# Patient Record
Sex: MALE | HISPANIC OR LATINO | URBAN - METROPOLITAN AREA
[De-identification: names, ages, dates, MRNs, and addresses within clinical notes are randomized per-mention and may not be internally consistent; named-entity substitution may affect disease eponyms.]

---

## 2023-09-13 ENCOUNTER — LAB REQUISITION (OUTPATIENT)
Dept: LAB | Facility: CLINIC | Age: 53
End: 2023-09-13

## 2023-09-13 DIAGNOSIS — I10 ESSENTIAL (PRIMARY) HYPERTENSION: ICD-10-CM

## 2023-09-13 PROCEDURE — 82374 ASSAY BLOOD CARBON DIOXIDE: CPT | Performed by: INTERNAL MEDICINE

## 2023-09-13 PROCEDURE — 80061 LIPID PANEL: CPT | Performed by: INTERNAL MEDICINE

## 2023-09-14 LAB
ANION GAP SERPL CALCULATED.3IONS-SCNC: 14 MMOL/L (ref 7–15)
BUN SERPL-MCNC: 13.7 MG/DL (ref 6–20)
CALCIUM SERPL-MCNC: 9.1 MG/DL (ref 8.6–10)
CHLORIDE SERPL-SCNC: 100 MMOL/L (ref 98–107)
CHOLEST SERPL-MCNC: 183 MG/DL
CREAT SERPL-MCNC: 1.11 MG/DL (ref 0.67–1.17)
DEPRECATED HCO3 PLAS-SCNC: 23 MMOL/L (ref 22–29)
EGFRCR SERPLBLD CKD-EPI 2021: 80 ML/MIN/1.73M2
GLUCOSE SERPL-MCNC: 85 MG/DL (ref 70–99)
HDLC SERPL-MCNC: 54 MG/DL
LDLC SERPL CALC-MCNC: 94 MG/DL
NONHDLC SERPL-MCNC: 129 MG/DL
POTASSIUM SERPL-SCNC: 4.5 MMOL/L (ref 3.4–5.3)
SODIUM SERPL-SCNC: 137 MMOL/L (ref 136–145)
TRIGL SERPL-MCNC: 177 MG/DL

## 2024-05-29 ENCOUNTER — LAB REQUISITION (OUTPATIENT)
Dept: LAB | Facility: CLINIC | Age: 54
End: 2024-05-29

## 2024-05-29 DIAGNOSIS — Z11.3 ENCOUNTER FOR SCREENING FOR INFECTIONS WITH A PREDOMINANTLY SEXUAL MODE OF TRANSMISSION: ICD-10-CM

## 2024-05-29 DIAGNOSIS — Z11.1 ENCOUNTER FOR SCREENING FOR RESPIRATORY TUBERCULOSIS: ICD-10-CM

## 2024-05-29 DIAGNOSIS — E78.5 HYPERLIPIDEMIA, UNSPECIFIED: ICD-10-CM

## 2024-05-29 LAB
CHOLEST SERPL-MCNC: 221 MG/DL
FASTING STATUS PATIENT QL REPORTED: NO
HBV CORE AB SERPL QL IA: NONREACTIVE
HBV SURFACE AB SERPL IA-ACNC: <3.5 M[IU]/ML
HBV SURFACE AB SERPL IA-ACNC: NONREACTIVE M[IU]/ML
HBV SURFACE AG SERPL QL IA: NONREACTIVE
HCV AB SERPL QL IA: NONREACTIVE
HDLC SERPL-MCNC: 49 MG/DL
LDLC SERPL CALC-MCNC: 111 MG/DL
NONHDLC SERPL-MCNC: 172 MG/DL
TRIGL SERPL-MCNC: 303 MG/DL

## 2024-05-29 PROCEDURE — 86704 HEP B CORE ANTIBODY TOTAL: CPT | Performed by: FAMILY MEDICINE

## 2024-05-29 PROCEDURE — 86803 HEPATITIS C AB TEST: CPT | Performed by: FAMILY MEDICINE

## 2024-05-29 PROCEDURE — 87340 HEPATITIS B SURFACE AG IA: CPT | Performed by: FAMILY MEDICINE

## 2024-05-29 PROCEDURE — 86481 TB AG RESPONSE T-CELL SUSP: CPT | Performed by: FAMILY MEDICINE

## 2024-05-29 PROCEDURE — 80061 LIPID PANEL: CPT | Performed by: FAMILY MEDICINE

## 2024-05-29 PROCEDURE — 86706 HEP B SURFACE ANTIBODY: CPT | Performed by: FAMILY MEDICINE

## 2024-05-30 LAB
GAMMA INTERFERON BACKGROUND BLD IA-ACNC: 0.03 IU/ML
M TB IFN-G BLD-IMP: NEGATIVE
M TB IFN-G CD4+ BCKGRND COR BLD-ACNC: 9.97 IU/ML
MITOGEN IGNF BCKGRD COR BLD-ACNC: 0.05 IU/ML
MITOGEN IGNF BCKGRD COR BLD-ACNC: 0.05 IU/ML
QUANTIFERON MITOGEN: 10 IU/ML
QUANTIFERON NIL TUBE: 0.03 IU/ML
QUANTIFERON TB1 TUBE: 0.08 IU/ML
QUANTIFERON TB2 TUBE: 0.08

## 2024-07-15 ENCOUNTER — LAB REQUISITION (OUTPATIENT)
Dept: LAB | Facility: CLINIC | Age: 54
End: 2024-07-15

## 2024-07-15 DIAGNOSIS — Z12.11 ENCOUNTER FOR SCREENING FOR MALIGNANT NEOPLASM OF COLON: ICD-10-CM

## 2024-07-15 PROCEDURE — 82274 ASSAY TEST FOR BLOOD FECAL: CPT | Performed by: FAMILY MEDICINE

## 2024-07-16 LAB — HEMOCCULT STL QL IA: NEGATIVE

## 2024-07-31 ENCOUNTER — LAB REQUISITION (OUTPATIENT)
Dept: LAB | Facility: CLINIC | Age: 54
End: 2024-07-31

## 2024-07-31 DIAGNOSIS — R30.0 DYSURIA: ICD-10-CM

## 2024-07-31 PROCEDURE — 87491 CHLMYD TRACH DNA AMP PROBE: CPT | Performed by: FAMILY MEDICINE

## 2024-07-31 PROCEDURE — 87591 N.GONORRHOEAE DNA AMP PROB: CPT | Performed by: FAMILY MEDICINE

## 2024-08-01 LAB
C TRACH DNA SPEC QL NAA+PROBE: NEGATIVE
N GONORRHOEA DNA SPEC QL NAA+PROBE: NEGATIVE

## 2024-08-19 ENCOUNTER — HOSPITAL ENCOUNTER (OUTPATIENT)
Dept: GENERAL RADIOLOGY | Facility: CLINIC | Age: 54
Discharge: HOME | End: 2024-08-19
Attending: FAMILY MEDICINE | Admitting: FAMILY MEDICINE
Payer: COMMERCIAL

## 2024-08-19 DIAGNOSIS — R05.3 CHRONIC COUGH: ICD-10-CM

## 2024-08-19 PROCEDURE — 71046 X-RAY EXAM CHEST 2 VIEWS: CPT

## 2024-08-20 ENCOUNTER — LAB REQUISITION (OUTPATIENT)
Dept: LAB | Facility: CLINIC | Age: 54
End: 2024-08-20

## 2024-08-20 DIAGNOSIS — R35.1 NOCTURIA: ICD-10-CM

## 2024-08-20 LAB — PSA SERPL DL<=0.01 NG/ML-MCNC: 0.39 NG/ML (ref 0–3.5)

## 2024-08-20 PROCEDURE — G0103 PSA SCREENING: HCPCS | Performed by: PEDIATRICS

## 2024-10-08 ENCOUNTER — LAB REQUISITION (OUTPATIENT)
Dept: LAB | Facility: CLINIC | Age: 54
End: 2024-10-08
Payer: COMMERCIAL

## 2024-10-08 DIAGNOSIS — R05.3 CHRONIC COUGH: ICD-10-CM

## 2024-10-08 LAB
BASOPHILS # BLD AUTO: 0 10E3/UL (ref 0–0.2)
BASOPHILS NFR BLD AUTO: 0 %
EOSINOPHIL # BLD AUTO: 0.1 10E3/UL (ref 0–0.7)
EOSINOPHIL NFR BLD AUTO: 2 %
ERYTHROCYTE [DISTWIDTH] IN BLOOD BY AUTOMATED COUNT: 12.6 % (ref 10–15)
HCT VFR BLD AUTO: 44.5 % (ref 40–53)
HGB BLD-MCNC: 14.4 G/DL (ref 13.3–17.7)
IMM GRANULOCYTES # BLD: 0 10E3/UL
IMM GRANULOCYTES NFR BLD: 0 %
LYMPHOCYTES # BLD AUTO: 1.6 10E3/UL (ref 0.8–5.3)
LYMPHOCYTES NFR BLD AUTO: 17 %
MCH RBC QN AUTO: 29.5 PG (ref 26.5–33)
MCHC RBC AUTO-ENTMCNC: 32.4 G/DL (ref 31.5–36.5)
MCV RBC AUTO: 91 FL (ref 78–100)
MONOCYTES # BLD AUTO: 1.1 10E3/UL (ref 0–1.3)
MONOCYTES NFR BLD AUTO: 12 %
NEUTROPHILS # BLD AUTO: 6.7 10E3/UL (ref 1.6–8.3)
NEUTROPHILS NFR BLD AUTO: 69 %
NRBC # BLD AUTO: 0 10E3/UL
NRBC BLD AUTO-RTO: 0 /100
PLATELET # BLD AUTO: 353 10E3/UL (ref 150–450)
RBC # BLD AUTO: 4.88 10E6/UL (ref 4.4–5.9)
WBC # BLD AUTO: 9.6 10E3/UL (ref 4–11)

## 2024-10-08 PROCEDURE — 85025 COMPLETE CBC W/AUTO DIFF WBC: CPT | Mod: ORL | Performed by: PEDIATRICS

## 2024-11-26 ENCOUNTER — MEDICAL CORRESPONDENCE (OUTPATIENT)
Dept: HEALTH INFORMATION MANAGEMENT | Facility: CLINIC | Age: 54
End: 2024-11-26

## 2024-11-26 ENCOUNTER — LAB REQUISITION (OUTPATIENT)
Dept: LAB | Facility: CLINIC | Age: 54
End: 2024-11-26
Payer: COMMERCIAL

## 2024-11-26 DIAGNOSIS — R05.3 CHRONIC COUGH: ICD-10-CM

## 2024-11-26 LAB — CRP SERPL-MCNC: 8.45 MG/L

## 2024-11-27 ENCOUNTER — TRANSCRIBE ORDERS (OUTPATIENT)
Dept: OTHER | Age: 54
End: 2024-11-27

## 2024-11-27 DIAGNOSIS — R05.3 CHRONIC COUGH: Primary | ICD-10-CM

## 2024-12-03 DIAGNOSIS — R05.3 CHRONIC COUGH: Primary | ICD-10-CM

## 2024-12-09 ENCOUNTER — ANCILLARY PROCEDURE (OUTPATIENT)
Dept: CT IMAGING | Facility: CLINIC | Age: 54
End: 2024-12-09
Attending: PEDIATRICS
Payer: COMMERCIAL

## 2024-12-09 DIAGNOSIS — R05.3 CHRONIC COUGH: ICD-10-CM

## 2024-12-09 PROCEDURE — 71250 CT THORAX DX C-: CPT

## 2024-12-15 ENCOUNTER — HEALTH MAINTENANCE LETTER (OUTPATIENT)
Age: 54
End: 2024-12-15

## 2024-12-17 ENCOUNTER — APPOINTMENT (OUTPATIENT)
Dept: INTERPRETER SERVICES | Facility: CLINIC | Age: 54
End: 2024-12-17
Payer: COMMERCIAL

## 2025-01-21 ENCOUNTER — LAB REQUISITION (OUTPATIENT)
Dept: LAB | Facility: CLINIC | Age: 55
End: 2025-01-21
Payer: COMMERCIAL

## 2025-01-21 ENCOUNTER — MEDICAL CORRESPONDENCE (OUTPATIENT)
Dept: HEALTH INFORMATION MANAGEMENT | Facility: CLINIC | Age: 55
End: 2025-01-21

## 2025-01-21 DIAGNOSIS — I73.00 RAYNAUD'S SYNDROME WITHOUT GANGRENE: ICD-10-CM

## 2025-01-21 LAB
ALBUMIN SERPL BCG-MCNC: 4.6 G/DL (ref 3.5–5.2)
ALP SERPL-CCNC: 54 U/L (ref 40–150)
ALT SERPL W P-5'-P-CCNC: 18 U/L (ref 0–70)
ANION GAP SERPL CALCULATED.3IONS-SCNC: 14 MMOL/L (ref 7–15)
AST SERPL W P-5'-P-CCNC: 24 U/L (ref 0–45)
BILIRUB SERPL-MCNC: 0.3 MG/DL
BUN SERPL-MCNC: 18.3 MG/DL (ref 6–20)
CALCIUM SERPL-MCNC: 9.4 MG/DL (ref 8.8–10.4)
CHLORIDE SERPL-SCNC: 101 MMOL/L (ref 98–107)
CK SERPL-CCNC: 253 U/L (ref 39–308)
CREAT SERPL-MCNC: 1.05 MG/DL (ref 0.67–1.17)
CRP SERPL-MCNC: 7.62 MG/L
EGFRCR SERPLBLD CKD-EPI 2021: 85 ML/MIN/1.73M2
GLUCOSE SERPL-MCNC: 77 MG/DL (ref 70–99)
HCO3 SERPL-SCNC: 24 MMOL/L (ref 22–29)
POTASSIUM SERPL-SCNC: 4.1 MMOL/L (ref 3.4–5.3)
PROT SERPL-MCNC: 8.3 G/DL (ref 6.4–8.3)
SODIUM SERPL-SCNC: 139 MMOL/L (ref 135–145)

## 2025-01-22 LAB — ANA SER QL IF: NEGATIVE

## 2025-01-26 NOTE — PROGRESS NOTES
Chief Complaint   Patient presents with    Consult     Tongue lesion x5mo.  Saw PCP and was referred to ENT.  Under side of tongue.  Not growing, just a small bump.        History of Present Illness   Julian D Reyes Tirado is a 54 year old male who presents today for evaluation.  I am seeing this patient in consultation for tongue ulcer at the request of the provider Dr. Liam Rowe. The patient was seen, examined, and counseled today with the help of an interpretor. The patient presents with a lesion on the ventral tongue. The patient has noticed for approximately 5-6 months. It hasn't been changing in size. It is not painful. No citrus or spicy intolerance. No bleeding. The patient denies any dysphagia, odynaphagia, pharyngodynia, otalgia, hemoptysis, dysphonia, neck lumps/bumps, swelling, or unintentional weight loss. The patient is a lifetime smoker. No history of chewing tobacco.     Past Medical History  There is no problem list on file for this patient.    Current Medications     Current Outpatient Medications:     amLODIPine (NORVASC) 10 MG tablet, Take 10 mg by mouth., Disp: , Rfl:     amLODIPine (NORVASC) 5 MG tablet, Take 2.5 mg by mouth., Disp: , Rfl:     atorvastatin (LIPITOR) 20 MG tablet, Take 20 mg by mouth daily., Disp: , Rfl:     benzonatate (TESSALON) 100 MG capsule, , Disp: , Rfl:     fluticasone (FLONASE) 50 MCG/ACT nasal spray, Spray 2 sprays in nostril., Disp: , Rfl:     Allergies  No Known Allergies    Social History   Social History     Socioeconomic History    Marital status:      Social Drivers of Health     Financial Resource Strain: Not on File (9/13/2023)    Received from [employer] [facility]    Financial Resource Strain     Financial Resource Strain: 0   Food Insecurity: Not on File (9/26/2024)    Received from [facility]    Food Insecurity     Food: 0   Transportation Needs: Not on File (9/13/2023)    Received from [facility]    Transportation Needs     Transportation: 0   Physical Activity: Not  on File (2023)    Received from [facility]    Physical Activity     Physical Activity: 0   Stress: Not on File (2023)    Received from [facility]    Stress     Stress: 0   Social Connections: Not on File (2024)    Received from [facility]    Social Connections     Connectedness: 0   Housing Stability: Not on File (2023)    Received from [employer]    Housing Stability     Housin       Family History  History reviewed. No pertinent family history.    Review of Systems  As per HPI and PMHx, otherwise 10+ comprehensive system review is negative.    Physical Exam  /83   Pulse 78   Resp 16   Wt 116.3 kg (256 lb 6.4 oz)   SpO2 99%   GENERAL: The patient is a pleasant, cooperative 54 year old male in no acute distress.  HEAD: Normocephalic, atraumatic. Hair and scalp are normal.  EYES: Pupils are equal, round, reactive to light and accommodation. Extraocular movements are intact. The sclera nonicteric without injection. The extraocular structures are normal.  EARS: Normal shape and symmetry. No tenderness when palpating the mastoid or tragal areas bilaterally. No mastoid erythema or fluctuance.   NOSE: Nares are patent.  Nasal mucosa is pink and moist.  Negative anterior rhinoscopy.  ORAL CAVITY: Lips are normal. Dentition is in good repair. Mucous membranes are moist. Tongue is mobile, protrudes to the midline.  Palate elevates symmetrically. Tonsils are 1+, symmetric. No erythema or exudate.  On the right side of the ventral surface of the tongue, there is a 3 to 4 mm slightly raised exophytic nonulcerative lesion consistent with a small fibroma.  No additional oral cavity or oropharyngeal masses, lesions, ulcerations, or leukoplakia.  NECK: Supple, trachea is midline. There is no palpable cervical lymphadenopathy or masses bilaterally. Palpation of the bilateral parotid and submandibular areas reveal no masses. No thyromegaly.    NEUROLOGIC: Cranial nerves II through XII are grossly intact. Voice is  strong. Patient is House-Brackmann I/VI bilaterally.  CARDIOVASCULAR: Extremities are warm and well-perfused. No significant peripheral edema.  RESPIRATORY: Patient has nonlabored breathing without cough, wheeze, stridor.  PSYCHIATRIC: Patient is alert and oriented. Mood and affect appear normal.  SKIN: Warm and dry. No scalp, face, or neck lesions noted.    Assessment and Plan     ICD-10-CM    1. Tongue lesion  K14.8         It was my pleasure seeing Moe D Reyeherberth Conti today in clinic. The patient was seen, examined, and counseled today with the help of an interpretor. The patient presents today with a lesion on the right ventral tongue.  This appears to be clinically a small fibroma.  There is no concerning findings for malignancy or neoplasm.  He is a non-smoker.  The lesion is not very bothersome to him.  We discussed observation with consideration of biopsy if the lesion becomes symptomatic or starts growing.  The patient will return to clinic as needed in the future with any problems or concerns.    I recommended treating this with Orabase for approximately two weeks. I would like to see the patient back in 2-4 weeks to make sure it heals. If not, we should proceed with biopsy.     Kartik Shields MD  Department of Otolaryngology-Head and Neck Surgery  [facility]

## 2025-01-29 ENCOUNTER — OFFICE VISIT (OUTPATIENT)
Dept: OTOLARYNGOLOGY | Facility: CLINIC | Age: 55
End: 2025-01-29
Payer: COMMERCIAL

## 2025-01-29 VITALS
DIASTOLIC BLOOD PRESSURE: 83 MMHG | OXYGEN SATURATION: 99 % | HEART RATE: 78 BPM | RESPIRATION RATE: 16 BRPM | WEIGHT: 256.4 LBS | SYSTOLIC BLOOD PRESSURE: 125 MMHG

## 2025-01-29 DIAGNOSIS — K14.8 TONGUE LESION: Primary | ICD-10-CM

## 2025-01-29 RX ORDER — AMLODIPINE BESYLATE 5 MG/1
2.5 TABLET ORAL
COMMUNITY
Start: 2023-11-15

## 2025-01-29 RX ORDER — FLUTICASONE PROPIONATE 50 MCG
2 SPRAY, SUSPENSION (ML) NASAL
COMMUNITY
Start: 2024-10-08

## 2025-01-29 RX ORDER — AMLODIPINE BESYLATE 10 MG/1
10 TABLET ORAL
COMMUNITY
Start: 2025-01-21 | End: 2025-04-21

## 2025-01-29 RX ORDER — BENZONATATE 100 MG/1
CAPSULE ORAL
COMMUNITY
Start: 2024-04-24

## 2025-01-29 RX ORDER — ATORVASTATIN CALCIUM 20 MG/1
20 TABLET, FILM COATED ORAL DAILY
COMMUNITY
Start: 2023-11-15

## 2025-01-29 NOTE — LETTER
1/29/2025      Julian D Reyes Tirado  [address]  [place] [address]      Dear Colleague,    Thank you for referring your patient, Julian D Reyes Tirado, to the [facility]. Please see a copy of my visit note below.    Chief Complaint   Patient presents with     Consult     Tongue lesion x5mo.  Saw PCP and was referred to ENT.  Under side of tongue.  Not growing, just a small bump.        History of Present Illness   Julian D Reyes Tirado is a 54 year old male who presents today for evaluation.  I am seeing this patient in consultation for tongue ulcer at the request of the provider Dr. Liam Rowe. The patient was seen, examined, and counseled today with the help of an interpretor. The patient presents with a lesion on the ventral tongue. The patient has noticed for approximately 5-6 months. It hasn't been changing in size. It is not painful. No citrus or spicy intolerance. No bleeding. The patient denies any dysphagia, odynaphagia, pharyngodynia, otalgia, hemoptysis, dysphonia, neck lumps/bumps, swelling, or unintentional weight loss. The patient is a lifetime smoker. No history of chewing tobacco.     Past Medical History  There is no problem list on file for this patient.    Current Medications     Current Outpatient Medications:      amLODIPine (NORVASC) 10 MG tablet, Take 10 mg by mouth., Disp: , Rfl:      amLODIPine (NORVASC) 5 MG tablet, Take 2.5 mg by mouth., Disp: , Rfl:      atorvastatin (LIPITOR) 20 MG tablet, Take 20 mg by mouth daily., Disp: , Rfl:      benzonatate (TESSALON) 100 MG capsule, , Disp: , Rfl:      fluticasone (FLONASE) 50 MCG/ACT nasal spray, Spray 2 sprays in nostril., Disp: , Rfl:     Allergies  No Known Allergies    Social History   Social History     Socioeconomic History     Marital status:      Social Drivers of Health     Financial Resource Strain: Not on File (9/13/2023)    [facility]    Financial Resource Strain       Financial Resource Strain: 0   Food Insecurity: Not on File (2024)    Received from [facility]    Food Insecurity      Food: 0   Transportation Needs: Not on File (2023)    Received from [facility]    Transportation Needs      Transportation: 0   Physical Activity: Not on File (2023)    Received from [facility]    Physical Activity      Physical Activity: 0   Stress: Not on File (2023)    Received from [facility]    Stress      Stress: 0   Social Connections: Not on File (2024)    Received from [facility]    Social Connections      Connectedness: 0   Housing Stability: Not on File (2023)    Received from [employer]    Housing Stability      Housin       Family History  History reviewed. No pertinent family history.    Review of Systems  As per HPI and PMHx, otherwise 10+ comprehensive system review is negative.    Physical Exam  /83   Pulse 78   Resp 16   Wt 116.3 kg (256 lb 6.4 oz)   SpO2 99%   GENERAL: The patient is a pleasant, cooperative 54 year old male in no acute distress.  HEAD: Normocephalic, atraumatic. Hair and scalp are normal.  EYES: Pupils are equal, round, reactive to light and accommodation. Extraocular movements are intact. The sclera nonicteric without injection. The extraocular structures are normal.  EARS: Normal shape and symmetry. No tenderness when palpating the mastoid or tragal areas bilaterally. No mastoid erythema or fluctuance.   NOSE: Nares are patent.  Nasal mucosa is pink and moist.  Negative anterior rhinoscopy.  ORAL CAVITY: Lips are normal. Dentition is in good repair. Mucous membranes are moist. Tongue is mobile, protrudes to the midline.  Palate elevates symmetrically. Tonsils are 1+, symmetric. No erythema or exudate.  On the right side of the ventral surface of the tongue, there is a 3 to 4 mm slightly raised exophytic nonulcerative lesion consistent with a small fibroma.  No additional oral cavity or oropharyngeal masses, lesions, ulcerations, or  leukoplakia.  NECK: Supple, trachea is midline. There is no palpable cervical lymphadenopathy or masses bilaterally. Palpation of the bilateral parotid and submandibular areas reveal no masses. No thyromegaly.    NEUROLOGIC: Cranial nerves II through XII are grossly intact. Voice is strong. Patient is House-Brackmann I/VI bilaterally.  CARDIOVASCULAR: Extremities are warm and well-perfused. No significant peripheral edema.  RESPIRATORY: Patient has nonlabored breathing without cough, wheeze, stridor.  PSYCHIATRIC: Patient is alert and oriented. Mood and affect appear normal.  SKIN: Warm and dry. No scalp, face, or neck lesions noted.    Assessment and Plan     ICD-10-CM    1. Tongue lesion  K14.8         It was my pleasure seeing Julian D Reyes Tirado today in clinic. The patient was seen, examined, and counseled today with the help of an interpretor. The patient presents today with a lesion on the right ventral tongue.  This appears to be clinically a small fibroma.  There is no concerning findings for malignancy or neoplasm.  He is a non-smoker.  The lesion is not very bothersome to him.  We discussed observation with consideration of biopsy if the lesion becomes symptomatic or starts growing.  The patient will return to clinic as needed in the future with any problems or concerns.    I recommended treating this with Orabase for approximately two weeks. I would like to see the patient back in 2-4 weeks to make sure it heals. If not, we should proceed with biopsy.     Kartik Shields MD  Department of Otolaryngology-Head and Neck Surgery  [facility]       Again, thank you for allowing me to participate in the care of your patient.        Sincerely,        Kartik Shields MD    Electronically signed

## 2025-03-31 ENCOUNTER — OFFICE VISIT (OUTPATIENT)
Dept: PULMONOLOGY | Facility: CLINIC | Age: 55
End: 2025-03-31
Payer: COMMERCIAL

## 2025-03-31 VITALS
DIASTOLIC BLOOD PRESSURE: 85 MMHG | HEART RATE: 81 BPM | HEIGHT: 72 IN | WEIGHT: 256 LBS | OXYGEN SATURATION: 98 % | SYSTOLIC BLOOD PRESSURE: 125 MMHG | RESPIRATION RATE: 16 BRPM | BODY MASS INDEX: 34.67 KG/M2

## 2025-03-31 DIAGNOSIS — R93.89 ABNORMAL CHEST CT: ICD-10-CM

## 2025-03-31 DIAGNOSIS — J45.40 MODERATE PERSISTENT ASTHMA, UNSPECIFIED WHETHER COMPLICATED: Primary | ICD-10-CM

## 2025-03-31 DIAGNOSIS — R05.3 CHRONIC COUGH: ICD-10-CM

## 2025-03-31 PROCEDURE — 1126F AMNT PAIN NOTED NONE PRSNT: CPT | Performed by: INTERNAL MEDICINE

## 2025-03-31 PROCEDURE — 3074F SYST BP LT 130 MM HG: CPT | Performed by: INTERNAL MEDICINE

## 2025-03-31 PROCEDURE — 99204 OFFICE O/P NEW MOD 45 MIN: CPT | Mod: GC | Performed by: INTERNAL MEDICINE

## 2025-03-31 PROCEDURE — 3079F DIAST BP 80-89 MM HG: CPT | Performed by: INTERNAL MEDICINE

## 2025-03-31 RX ORDER — BUDESONIDE AND FORMOTEROL FUMARATE DIHYDRATE 160; 4.5 UG/1; UG/1
AEROSOL RESPIRATORY (INHALATION)
Qty: 20.4 G | Refills: 11 | Status: SHIPPED | OUTPATIENT
Start: 2025-03-31

## 2025-03-31 ASSESSMENT — PAIN SCALES - GENERAL: PAINLEVEL_OUTOF10: NO PAIN (0)

## 2025-03-31 NOTE — NURSING NOTE
"Chief Complaint   Patient presents with    Consult     Pt referred for chronic cough       Vitals:    03/31/25 0812   BP: 125/85   Pulse: 81   Resp: 16   SpO2: 98%   Weight: 116.1 kg (256 lb)   Height: 1.82 m (5' 11.65\")       Body mass index is 35.06 kg/m .     VIKASH England lPN  "

## 2025-03-31 NOTE — PROGRESS NOTES
[facility] [address] [address]  Phone: [phone]  Fax: [phone]    Patient:  Julian D Reyes Tirado, Date of birth 1970  Date of Visit:  03/31/2025  Reason for Consult: Chronic Cough     Pulmonary Clinic New Patient Consult  Assessment and Plan:   Julian D Reyes Tirado is a 55 year old male with a history of HTN, HLD who presents to pulmonary clinic for further evaluation of chronic cough.   # Suspect Asthma (Cough Variant): Normal PFTs and FeNo. Patient reports 20 year history of a chronic cough, he also reports history of sensitivities to things like dust and perfumes which cause his cough and rhinosinusitus to worsen. Other triggers for his cough include time of day (worse at bedtime) and cold weather. CT Chest showed no evidence for interstitial lung disease or pneumonoconiosis which were queried given occupational exposures.  Differential is most consistent with cough variant asthma with allergic features vs less likely chronic Reactive Airway Dysfunction Syndrome with his history of exposures to fumes/corrosive gases. Treatment for both would include inhaled corticosteroid and long acting bronchodilator. Suggest starting SMART therapy with  2 puffs twice daily + prn use of Symbicort (up to 12 puffs a day) and instructed of proper use including rinsing mouth after use to prevent the development of oral thrush.   # Lingular GGO (seen of Dec. 2024 CT Chest): CT Chest in December of 2024 showed minimal lingular clustered ground glass foci measuring up to 12 mm. Suspect findings are inflammation related to a viral respiratory infection at the time or inflammation related to untreated asthma. Plan to repeat CT chest in 1 month following initiation of treatment for asthma with Symbicort as discussed above.    # Vaccinations:  Appears to be due for pneumonia vaccination. Recommended updating this vaccination if patient is agreeable with  PCP or at next pulmonary appointment.   Questions and concerns were answered to the patient's satisfaction.  He was provided with my contact information should new questions or concerns arise in the interim.  He should return to pulmonary clinic in 3 months with myself or Kimberly Patterson PA-C.  This patient was staffed with attending physician, Dr. Brown.  Yolanda Keyes MD  PGY-2  Internal Medicine  [facility]     Physician Attestation   I, Ester Brown MD, saw and discussed this patient with the resident/fellow.  I agree with the resident/fellow findings and plan of care as documented in their note. I have personally reviewed today's vital signs, medications, laboratory results and imaging results.    I have personally edited this note to reflect our joint assessment and medical decision making.    Ester Brown MD  Date of Service (when I saw the patient): 03/31/25        History of Present Illness    Julian D Reyes Tirado is a 55 year old male with a history of HTN and HLD who presents with his wife to pulmonary clinic for further evaluation of a chronic dry cough.     From Chart Review:  Seen by PCP on 11/26/24 for chronic cough, at that time it was reported that cough had been persistent, with occasional coughing fits in the morning. He also reported some shortness of breath occasionally when laying down. CXR was wnl. 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 They used to undergo yearly screenings though this went away when [facility] had a regime change. Has been screened for TB. Treated for GERD and post nasal drip as causes of chronic cough. Spriometry unremarkable.     A Russian interpretor was used for this visit.     He reports that he developed a chronic dry cough about 20 years ago. He reports that he worked as a  for ~ 19 years and was exposed to many fumes (was able  "to name ammonia, H2S, CO2 as some of the fumes). Previously he lived in [place], he states that there he was diagnosed with rhinosinusitus and also a deviated septum. He is currently not on any inhalers and does not think that he has used one before. He occasionally uses flonase, but doesn't find it particularly helpful. He denies symptoms of heart burn/abdominal pain. He does report he is very sensitive to exposures, when asked about dust in the house he states that he is more sensitive to it that others are (it will cause him to get a runny nose and tears as well as worsening of his cough). This also occurs if he tries to help his wife with the laundry and is what keeps him from using any perfumes. He reports that his cough is worse with cold weather and when he goes to bed/wakes up in the morning. Also notes his cough being worse at breakfast. He does not remember being sick as a child particularly often. Brother has asthma and uses an inhaler. ROS is negative for fevers, chills, nausea, abdominal pain, diarrhea, peripheral swelling or chest pain.     Review of Systems:  10 of 14 systems reviewed and are negative unless otherwise stated in HPI.    No past medical history on file.      Current Outpatient Medications:     amLODIPine (NORVASC) 10 MG tablet, Take 10 mg by mouth., Disp: , Rfl:     amLODIPine (NORVASC) 5 MG tablet, Take 2.5 mg by mouth., Disp: , Rfl:     atorvastatin (LIPITOR) 20 MG tablet, Take 20 mg by mouth daily., Disp: , Rfl:     benzonatate (TESSALON) 100 MG capsule, , Disp: , Rfl:     fluticasone (FLONASE) 50 MCG/ACT nasal spray, Spray 2 sprays in nostril., Disp: , Rfl:       Physical Exam:  /85   Pulse 81   Resp 16   Ht 1.82 m (5' 11.65\")   Wt 116.1 kg (256 lb)   SpO2 98%   BMI 35.06 kg/m    GENERAL: Well developed, well nourished, alert, and in no apparent distress.  HEENT: Normocephalic, atraumatic. PERRL, EOMI. Oral mucosa is moist. No perioral cyanosis. Noticeable Epiphora. " Sniffling intermittently throughout exam.   NECK: supple, no obvious masses.  RESP:  Normal respiratory effort.  CTAB.  No rales, wheezes, rhonchi.  No cyanosis or clubbing. Good air movement throughout.   CV: Normal S1, S2, regular rhythm, normal rate. No murmur.  No LE edema.   ABDOMEN: non-distended.   SKIN: warm and dry. No rash. Xerosis of the hands. No joint swelling noted.   NEURO: Alert and oriented.  Fluent speech.  PSYCH: mentation appears normal.   Results (personally reviewed in clinic today):  PFTs:  3/28/2025 Pulmonary Function Testing: The FVC, FEV1, FEV1/FVC ratio and LAZ63-34% are within normal limits.  The inspiratory flow rates are within normal limits.  Lung volumes are within normal limits.  The diffusing capacity is normal.  However, the diffusing capacity was not corrected for the patient's hemoglobin.     FeNO <25 ppb suggests that eosinophilic inflammation and responsiveness to corticosteroids are less likely.   Imaging:  HRCT 12/9/24: IMPRESSION:    1.  Minimal lingular clustered groundglass foci measuring up to 12 mm. The appearance suggests infectious / inflammatory foci. However, 3-6 month follow-up chest CT could be performed to evaluate resolution and exclusion of chronic groundglass nodules.   2.  Remaining lungs are clear.    3.  No interstitial lung disease.    4.  No significant airway findings.  Labs - CRP 8.45 11/26/25, 7.62 1/21/25  DORIE, CK negative in Jan 2025

## 2025-03-31 NOTE — PATIENT INSTRUCTIONS
-START Symbicort 2 puffs twice daily (AM + PM). May use 1 puff as needed throughout the day up to a max of 12 puffs daily.   -Remember to rinse your mouth out with water following use.  -Get CT chest in 4-6 weeks after starting Symbicort.  Call number provided to make appointment.    Return to clinic in 3-4 months with myself or Kimberly Patterson PA-C.

## 2025-03-31 NOTE — LETTER
3/31/2025      Julian D Reyes Tirado  [address]  [place] [address]      Dear Colleague,    Thank you for referring your patient, Julian D Reyes Tirado, to the [facility]. Please see a copy of my visit note below.      [facility]  [address] [place] [address] [address]  [facility] [address]  Phone: [phone]  Fax: [phone]    Patient:  Julian D Reyes Tirado, Date of birth 1970  Date of Visit:  03/31/2025  Reason for Consult: Chronic Cough     Pulmonary Clinic New Patient Consult  Assessment and Plan:   Julian D Reyes Tirado is a 55 year old male with a history of HTN, HLD who presents to pulmonary clinic for further evaluation of chronic cough.   # Suspect Asthma (Cough Variant): Normal PFTs and FeNo. Patient reports 20 year history of a chronic cough, he also reports history of sensitivities to things like dust and perfumes which cause his cough and rhinosinusitus to worsen. Other triggers for his cough include time of day (worse at bedtime) and cold weather. CT Chest showed no evidence for interstitial lung disease or pneumonoconiosis which were queried given occupational exposures.  Differential is most consistent with cough variant asthma with allergic features vs less likely chronic Reactive Airway Dysfunction Syndrome with his history of exposures to fumes/corrosive gases. Treatment for both would include inhaled corticosteroid and long acting bronchodilator. Suggest starting SMART therapy with  2 puffs twice daily + prn use of Symbicort (up to 12 puffs a day) and instructed of proper use including rinsing mouth after use to prevent the development of oral thrush.   # Lingular GGO (seen of Dec. 2024 CT Chest): CT Chest in December of 2024 showed minimal lingular clustered ground glass foci measuring up to 12 mm. Suspect findings are inflammation related to a viral respiratory infection at the time or inflammation  related to untreated asthma. Plan to repeat CT chest in 1 month following initiation of treatment for asthma with Symbicort as discussed above.    # Vaccinations:  Appears to be due for pneumonia vaccination. Recommended updating this vaccination if patient is agreeable with PCP or at next pulmonary appointment.   Questions and concerns were answered to the patient's satisfaction.  He was provided with my contact information should new questions or concerns arise in the interim.  He should return to pulmonary clinic in 3 months with myself or Kimberly Patterson PA-C.  This patient was staffed with attending physician, Dr. Brown.  Yolanda Keyes MD  PGY-2  Internal Medicine  [facility]     Physician Attestation  I, Ester Brown MD, saw and discussed this patient with the resident/fellow.  I agree with the resident/fellow findings and plan of care as documented in their note. I have personally reviewed today's vital signs, medications, laboratory results and imaging results.    I have personally edited this note to reflect our joint assessment and medical decision making.    Ester Brown MD  Date of Service (when I saw the patient): 03/31/25        History of Present Illness    Julian D Reyes Tirado is a 55 year old male with a history of HTN and HLD who presents with his wife to pulmonary clinic for further evaluation of a chronic dry cough.     From Chart Review:  Seen by PCP on 11/26/24 for chronic cough, at that time it was reported that cough had been persistent, with occasional coughing fits in the morning. He also reported some shortness of breath occasionally when laying down. CXR was wnl. 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 They used to undergo yearly screenings though this went away when [facility] had a regime change. Has been screened for TB. Treated for GERD and post nasal  drip as causes of chronic cough. Spriometry unremarkable.     A Polish interpretor was used for this visit.     He reports that he developed a chronic dry cough about 20 years ago. He reports that he worked as a  for ~ 19 years and was exposed to many fumes (was able to name ammonia, H2S, CO2 as some of the fumes). Previously he lived in [place], he states that there he was diagnosed with rhinosinusitus and also a deviated septum. He is currently not on any inhalers and does not think that he has used one before. He occasionally uses flonase, but doesn't find it particularly helpful. He denies symptoms of heart burn/abdominal pain. He does report he is very sensitive to exposures, when asked about dust in the house he states that he is more sensitive to it that others are (it will cause him to get a runny nose and tears as well as worsening of his cough). This also occurs if he tries to help his wife with the laundry and is what keeps him from using any perfumes. He reports that his cough is worse with cold weather and when he goes to bed/wakes up in the morning. Also notes his cough being worse at breakfast. He does not remember being sick as a child particularly often. Brother has asthma and uses an inhaler. ROS is negative for fevers, chills, nausea, abdominal pain, diarrhea, peripheral swelling or chest pain.     Review of Systems:  10 of 14 systems reviewed and are negative unless otherwise stated in HPI.    No past medical history on file.      Current Outpatient Medications:      amLODIPine (NORVASC) 10 MG tablet, Take 10 mg by mouth., Disp: , Rfl:      amLODIPine (NORVASC) 5 MG tablet, Take 2.5 mg by mouth., Disp: , Rfl:      atorvastatin (LIPITOR) 20 MG tablet, Take 20 mg by mouth daily., Disp: , Rfl:      benzonatate (TESSALON) 100 MG capsule, , Disp: , Rfl:      fluticasone (FLONASE) 50 MCG/ACT nasal spray, Spray 2 sprays in nostril., Disp: , Rfl:       Physical Exam:  /85   Pulse 81    "Resp 16   Ht 1.82 m (5' 11.65\")   Wt 116.1 kg (256 lb)   SpO2 98%   BMI 35.06 kg/m    GENERAL: Well developed, well nourished, alert, and in no apparent distress.  HEENT: Normocephalic, atraumatic. PERRL, EOMI. Oral mucosa is moist. No perioral cyanosis. Noticeable Epiphora. Sniffling intermittently throughout exam.   NECK: supple, no obvious masses.  RESP:  Normal respiratory effort.  CTAB.  No rales, wheezes, rhonchi.  No cyanosis or clubbing. Good air movement throughout.   CV: Normal S1, S2, regular rhythm, normal rate. No murmur.  No LE edema.   ABDOMEN: non-distended.   SKIN: warm and dry. No rash. Xerosis of the hands. No joint swelling noted.   NEURO: Alert and oriented.  Fluent speech.  PSYCH: mentation appears normal.   Results (personally reviewed in clinic today):  PFTs:  3/28/2025 Pulmonary Function Testing: The FVC, FEV1, FEV1/FVC ratio and FGM78-65% are within normal limits.  The inspiratory flow rates are within normal limits.  Lung volumes are within normal limits.  The diffusing capacity is normal.  However, the diffusing capacity was not corrected for the patient's hemoglobin.     FeNO <25 ppb suggests that eosinophilic inflammation and responsiveness to corticosteroids are less likely.   Imaging:  HRCT 12/9/24: IMPRESSION:    1.  Minimal lingular clustered groundglass foci measuring up to 12 mm. The appearance suggests infectious / inflammatory foci. However, 3-6 month follow-up chest CT could be performed to evaluate resolution and exclusion of chronic groundglass nodules.   2.  Remaining lungs are clear.    3.  No interstitial lung disease.    4.  No significant airway findings.  Labs - CRP 8.45 11/26/25, 7.62 1/21/25  DORIE, CK negative in Jan 2025                                         Again, thank you for allowing me to participate in the care of your patient.        Sincerely,        Ester Brown MD    Electronically signed"

## 2025-04-30 ENCOUNTER — ANCILLARY PROCEDURE (OUTPATIENT)
Dept: CT IMAGING | Facility: CLINIC | Age: 55
End: 2025-04-30
Attending: INTERNAL MEDICINE
Payer: COMMERCIAL

## 2025-04-30 DIAGNOSIS — R93.89 ABNORMAL CHEST CT: ICD-10-CM

## 2025-04-30 DIAGNOSIS — J45.40 MODERATE PERSISTENT ASTHMA, UNSPECIFIED WHETHER COMPLICATED: ICD-10-CM

## 2025-04-30 PROCEDURE — 71250 CT THORAX DX C-: CPT

## 2025-06-30 ENCOUNTER — OFFICE VISIT (OUTPATIENT)
Dept: PULMONOLOGY | Facility: CLINIC | Age: 55
End: 2025-06-30
Attending: INTERNAL MEDICINE
Payer: COMMERCIAL

## 2025-06-30 VITALS
BODY MASS INDEX: 35.54 KG/M2 | DIASTOLIC BLOOD PRESSURE: 83 MMHG | OXYGEN SATURATION: 99 % | SYSTOLIC BLOOD PRESSURE: 133 MMHG | HEART RATE: 85 BPM | WEIGHT: 259.5 LBS

## 2025-06-30 DIAGNOSIS — J45.40 MODERATE PERSISTENT ASTHMA, UNSPECIFIED WHETHER COMPLICATED: Primary | ICD-10-CM

## 2025-06-30 DIAGNOSIS — R91.8 PULMONARY NODULES: ICD-10-CM

## 2025-06-30 PROCEDURE — 1126F AMNT PAIN NOTED NONE PRSNT: CPT | Performed by: INTERNAL MEDICINE

## 2025-06-30 PROCEDURE — 3079F DIAST BP 80-89 MM HG: CPT | Performed by: INTERNAL MEDICINE

## 2025-06-30 PROCEDURE — 3075F SYST BP GE 130 - 139MM HG: CPT | Performed by: INTERNAL MEDICINE

## 2025-06-30 PROCEDURE — 99215 OFFICE O/P EST HI 40 MIN: CPT | Performed by: INTERNAL MEDICINE

## 2025-06-30 RX ORDER — BUDESONIDE AND FORMOTEROL FUMARATE DIHYDRATE 160; 4.5 UG/1; UG/1
AEROSOL RESPIRATORY (INHALATION)
Qty: 20.4 G | Refills: 11 | Status: SHIPPED | OUTPATIENT
Start: 2025-06-30

## 2025-06-30 RX ORDER — LOSARTAN POTASSIUM 100 MG/1
100 TABLET ORAL DAILY
COMMUNITY
Start: 2025-05-08

## 2025-06-30 ASSESSMENT — ASTHMA QUESTIONNAIRES
QUESTION_4 LAST FOUR WEEKS HOW OFTEN HAVE YOU USED YOUR RESCUE INHALER OR NEBULIZER MEDICATION (SUCH AS ALBUTEROL): ONCE A WEEK OR LESS
QUESTION_5 LAST FOUR WEEKS HOW WOULD YOU RATE YOUR ASTHMA CONTROL: COMPLETELY CONTROLLED
ACT_TOTALSCORE: 18
QUESTION_2 LAST FOUR WEEKS HOW OFTEN HAVE YOU HAD SHORTNESS OF BREATH: MORE THAN ONCE A DAY
ACT_TOTALSCORE: 18
QUESTION_1 LAST FOUR WEEKS HOW MUCH OF THE TIME DID YOUR ASTHMA KEEP YOU FROM GETTING AS MUCH DONE AT WORK, SCHOOL OR AT HOME: NONE OF THE TIME
QUESTION_3 LAST FOUR WEEKS HOW OFTEN DID YOUR ASTHMA SYMPTOMS (WHEEZING, COUGHING, SHORTNESS OF BREATH, CHEST TIGHTNESS OR PAIN) WAKE YOU UP AT NIGHT OR EARLIER THAN USUAL IN THE MORNING: ONCE A WEEK

## 2025-06-30 ASSESSMENT — PAIN SCALES - GENERAL: PAINLEVEL_OUTOF10: NO PAIN (0)

## 2025-06-30 NOTE — PATIENT INSTRUCTIONS
-Continue Symbicort.  Inhale 1 puff as needed. May use up to 12 puffs per day..  Remember to rinse your mouth out with water to decrease risk of thrush.    Return to clinic in 1 year with CT chest for pulmonary nodule.

## 2025-06-30 NOTE — LETTER
6/30/2025      Julian D Reyes Tirado  [address]  [place] [address]      Dear Colleague,    Thank you for referring your patient, Julian D Reyes Tirado, to the [facility]. Please see a copy of my visit note below.      [facility]  [address] [place] [address] [address]  [facility] [address]  Phone: [phone]  Fax: [phone]    Patient:  Julian D Reyes Tirado, Date of birth 1970  Date of Visit:  06/30/2025    Pulmonary Clinic New Patient Consult  Assessment and Plan:   Julian D Reyes Tirado is a 55 year old male with a history of HTN, HLD who presents to pulmonary clinic for further evaluation of suspected cough variant asthma.  Asthma (Cough Variant): Normal PFTs and FeNo. Patient reports 20 year history of a chronic cough + sensitivities to strong fragrances and cold weather.  Has had an excellent response to SMART therapy with high dose Symbicort with nearly complete resolution of cough.  We'll plan to continue his Symbicort, but will adjust the prescription to how he is using it, which is 1 puff as needed up to max 12 puffs per day. He was reminded to rinse his mouth out with water following use to decrease risk of thrush.  If further escalation is needed in the future, we could reinstitute more regular Symbicort use and could also consider Singulair.   Lingular GGO (seen of Dec. 2024 CT Chest): CT Chest in December of 2024 showed minimal lingular clustered ground glass foci measuring up to 12 mm, favored to be infectious/inflammatory in etiology. Follow up CT from April showed resolved infiltrates.  Pulmonary nodule.  3 mm pulmonary nodule seen on CT chest from April.  Given history of smoking he is high risk and he wishes to pursue optional CT in 1 year for follow up.        Questions and concerns were answered to the patient's satisfaction.  He was provided with my contact information should new questions or concerns  arise in the interim.  He should return to pulmonary clinic in 12 months with myself or Kimberly Patterson PA-C with CT chest.    Tamar Brown MD  Pulmonary and Critical Care Medicine    I spent 41 minutes on the date of encounter doing chart review, review of outside records, review of test results, conducting the patient visit, completing documentation and further activities as noted above.    History of Present Illness    Julian D Reyes Tirado is a 55 year old male with a history of HTN and HLD who presents with his wife to pulmonary clinic for follow up of asthma.  To briefly review, he was seen by her PCP in fall 2024 for persistent cough.  CXR was normal as was FeNO and PFTs.  Cough variant asthma was suspected and he was started on SMART therapy with high dose Symbicort.  Repeat CT chest for follow up of ggo in the lingula was obtained in April and demonstrated resolution of infiltrates.      Julia Reyes returns to clinic today and reports the following:  A Slovak interpretor was used for this visit.     -Things are going very well for him.  The cough is substantially improved.  -He continues on Symbicort.  Since he has improved, he is using the inhaler only as necessary.  This is about 2x per week.  -No episodes of pneumonia or bronchitis since our last visit.  -Only coughs with exposure to strong fragrances now.  Cough is very minimal.  -Does experience some shortness of breath but not as often as before.    -Remote, brief smoking history.  Smoked about 2 years; quit 35 years ago.      Review of Systems:  10 of 14 systems reviewed and are negative unless otherwise stated in HPI.    No past medical history on file.      Current Outpatient Medications:      amLODIPine (NORVASC) 10 MG tablet, Take 10 mg by mouth. (Patient taking differently: Take 0.5 mg by mouth.), Disp: , Rfl:      amLODIPine (NORVASC) 5 MG tablet, Take 5 mg by mouth., Disp: , Rfl:      benzonatate (TESSALON) 100 MG capsule, , Disp: , Rfl:       budesonide-formoterol (SYMBICORT/BREYNA) 160-4.5 MCG/ACT Inhaler, Inhale 2 puffs twice daily plus 1 puff as needed. May use up to 12 puffs per day., Disp: 20.4 g, Rfl: 11     losartan (COZAAR) 100 MG tablet, Take 100 mg by mouth daily., Disp: , Rfl:      atorvastatin (LIPITOR) 20 MG tablet, Take 20 mg by mouth daily. (Patient not taking: Reported on 6/30/2025), Disp: , Rfl:      fluticasone (FLONASE) 50 MCG/ACT nasal spray, Spray 2 sprays in nostril. (Patient not taking: Reported on 6/30/2025), Disp: , Rfl:       Physical Exam:  /83   Pulse 85   Wt 117.7 kg (259 lb 8 oz)   SpO2 99%   BMI 35.54 kg/m    GENERAL: Well developed, well nourished, alert, and in no apparent distress.  HEENT: Normocephalic, atraumatic. PERRL, EOMI. Oral mucosa is moist. No perioral cyanosis. Noticeable Epiphora. Sniffling intermittently throughout exam.   NECK: supple, no obvious masses.  RESP:  Normal respiratory effort.  CTAB.  No rales, wheezes, rhonchi.  No cyanosis or clubbing. Good air movement throughout.   CV: Normal S1, S2, regular rhythm, normal rate. No murmur.  No LE edema.   ABDOMEN: non-distended.   SKIN: warm and dry. No rash. Xerosis of the hands. No joint swelling noted.   NEURO: Alert and oriented.  Fluent speech.  PSYCH: mentation appears normal.     Results (personally reviewed in clinic today):  CT 4/30:   1.  Previously described minimal groundglass nodularity in the lingula has resolved.  2.  A 0.3 cm nodule in the right middle lobe is unchanged. Please refer to follow-up guidelines below.                                       Again, thank you for allowing me to participate in the care of your patient.        Sincerely,        Ester Brown MD    Electronically signed

## 2025-06-30 NOTE — PROGRESS NOTES
[facility] [address] [address]  Phone: [phone]  Fax: [phone]    Patient:  Julian D Reyes Tirado, Date of birth 1970  Date of Visit:  06/30/2025    Pulmonary Clinic New Patient Consult  Assessment and Plan:   Julian D Reyes Tirado is a 55 year old male with a history of HTN, HLD who presents to pulmonary clinic for further evaluation of suspected cough variant asthma.  Asthma (Cough Variant): Normal PFTs and FeNo. Patient reports 20 year history of a chronic cough + sensitivities to strong fragrances and cold weather.  Has had an excellent response to SMART therapy with high dose Symbicort with nearly complete resolution of cough.  We'll plan to continue his Symbicort, but will adjust the prescription to how he is using it, which is 1 puff as needed up to max 12 puffs per day. He was reminded to rinse his mouth out with water following use to decrease risk of thrush.  If further escalation is needed in the future, we could reinstitute more regular Symbicort use and could also consider Singulair.   Lingular GGO (seen of Dec. 2024 CT Chest): CT Chest in December of 2024 showed minimal lingular clustered ground glass foci measuring up to 12 mm, favored to be infectious/inflammatory in etiology. Follow up CT from April showed resolved infiltrates.  Pulmonary nodule.  3 mm pulmonary nodule seen on CT chest from April.  Given history of smoking he is high risk and he wishes to pursue optional CT in 1 year for follow up.        Questions and concerns were answered to the patient's satisfaction.  He was provided with my contact information should new questions or concerns arise in the interim.  He should return to pulmonary clinic in 12 months with myself or Kimberly Patterson PA-C with CT chest.    Tamar Brown MD  Pulmonary and Critical Care Medicine    I spent 41 minutes on the date of encounter doing chart review, review of outside  records, review of test results, conducting the patient visit, completing documentation and further activities as noted above.    History of Present Illness    Julian D Reyes Tirado is a 55 year old male with a history of HTN and HLD who presents with his wife to pulmonary clinic for follow up of asthma.  To briefly review, he was seen by her PCP in fall 2024 for persistent cough.  CXR was normal as was FeNO and PFTs.  Cough variant asthma was suspected and he was started on SMART therapy with high dose Symbicort.  Repeat CT chest for follow up of ggo in the lingula was obtained in April and demonstrated resolution of infiltrates.      Julia Reyes returns to clinic today and reports the following:  A Chadian interpretor was used for this visit.     -Things are going very well for him.  The cough is substantially improved.  -He continues on Symbicort.  Since he has improved, he is using the inhaler only as necessary.  This is about 2x per week.  -No episodes of pneumonia or bronchitis since our last visit.  -Only coughs with exposure to strong fragrances now.  Cough is very minimal.  -Does experience some shortness of breath but not as often as before.    -Remote, brief smoking history.  Smoked about 2 years; quit 35 years ago.      Review of Systems:  10 of 14 systems reviewed and are negative unless otherwise stated in HPI.    No past medical history on file.      Current Outpatient Medications:     amLODIPine (NORVASC) 10 MG tablet, Take 10 mg by mouth. (Patient taking differently: Take 0.5 mg by mouth.), Disp: , Rfl:     amLODIPine (NORVASC) 5 MG tablet, Take 5 mg by mouth., Disp: , Rfl:     benzonatate (TESSALON) 100 MG capsule, , Disp: , Rfl:     budesonide-formoterol (SYMBICORT/BREYNA) 160-4.5 MCG/ACT Inhaler, Inhale 2 puffs twice daily plus 1 puff as needed. May use up to 12 puffs per day., Disp: 20.4 g, Rfl: 11    losartan (COZAAR) 100 MG tablet, Take 100 mg by mouth daily., Disp: , Rfl:     atorvastatin  (LIPITOR) 20 MG tablet, Take 20 mg by mouth daily. (Patient not taking: Reported on 6/30/2025), Disp: , Rfl:     fluticasone (FLONASE) 50 MCG/ACT nasal spray, Spray 2 sprays in nostril. (Patient not taking: Reported on 6/30/2025), Disp: , Rfl:       Physical Exam:  /83   Pulse 85   Wt 117.7 kg (259 lb 8 oz)   SpO2 99%   BMI 35.54 kg/m    GENERAL: Well developed, well nourished, alert, and in no apparent distress.  HEENT: Normocephalic, atraumatic. PERRL, EOMI. Oral mucosa is moist. No perioral cyanosis. Noticeable Epiphora. Sniffling intermittently throughout exam.   NECK: supple, no obvious masses.  RESP:  Normal respiratory effort.  CTAB.  No rales, wheezes, rhonchi.  No cyanosis or clubbing. Good air movement throughout.   CV: Normal S1, S2, regular rhythm, normal rate. No murmur.  No LE edema.   ABDOMEN: non-distended.   SKIN: warm and dry. No rash. Xerosis of the hands. No joint swelling noted.   NEURO: Alert and oriented.  Fluent speech.  PSYCH: mentation appears normal.     Results (personally reviewed in clinic today):  CT 4/30:   1.  Previously described minimal groundglass nodularity in the lingula has resolved.  2.  A 0.3 cm nodule in the right middle lobe is unchanged. Please refer to follow-up guidelines below.

## 2025-08-22 ENCOUNTER — MYC REFILL (OUTPATIENT)
Dept: PULMONOLOGY | Facility: CLINIC | Age: 55
End: 2025-08-22
Payer: COMMERCIAL

## 2025-08-22 DIAGNOSIS — J45.40 MODERATE PERSISTENT ASTHMA, UNSPECIFIED WHETHER COMPLICATED: ICD-10-CM

## 2025-08-25 RX ORDER — BUDESONIDE AND FORMOTEROL FUMARATE DIHYDRATE 160; 4.5 UG/1; UG/1
AEROSOL RESPIRATORY (INHALATION)
Qty: 20.4 G | Refills: 11 | OUTPATIENT
Start: 2025-08-25